# Patient Record
Sex: FEMALE | Race: WHITE | Employment: FULL TIME | URBAN - METROPOLITAN AREA
[De-identification: names, ages, dates, MRNs, and addresses within clinical notes are randomized per-mention and may not be internally consistent; named-entity substitution may affect disease eponyms.]

---

## 2017-05-30 PROCEDURE — 88175 CYTOPATH C/V AUTO FLUID REDO: CPT | Performed by: INTERNAL MEDICINE

## 2018-05-23 PROBLEM — R79.89 ELEVATED LFTS: Status: ACTIVE | Noted: 2017-05-31

## 2018-05-23 PROBLEM — R79.89 ELEVATED LFTS: Status: ACTIVE | Noted: 2018-05-23

## 2018-07-02 PROBLEM — M25.561 KNEE MENISCUS PAIN, RIGHT: Status: ACTIVE | Noted: 2018-07-02

## 2018-07-11 PROBLEM — R74.8 ELEVATED ALKALINE PHOSPHATASE LEVEL: Status: ACTIVE | Noted: 2018-07-11

## 2018-07-11 PROBLEM — R79.89 ELEVATED LFTS: Status: RESOLVED | Noted: 2017-05-31 | Resolved: 2018-07-11

## 2018-07-11 PROBLEM — R73.9 HYPERGLYCEMIA: Status: ACTIVE | Noted: 2018-07-11

## 2018-07-17 PROBLEM — R03.0 ELEVATED BLOOD PRESSURE READING IN OFFICE WITHOUT DIAGNOSIS OF HYPERTENSION: Status: ACTIVE | Noted: 2018-07-17

## 2018-07-17 PROBLEM — R03.0 ELEVATED BLOOD PRESSURE READING IN OFFICE WITHOUT DIAGNOSIS OF HYPERTENSION: Status: RESOLVED | Noted: 2018-07-17 | Resolved: 2018-07-17

## 2018-10-23 PROBLEM — M75.41 IMPINGEMENT SYNDROME OF RIGHT SHOULDER: Status: ACTIVE | Noted: 2018-10-23

## 2018-10-23 PROBLEM — M19.011 ARTHRITIS OF RIGHT SHOULDER REGION: Status: ACTIVE | Noted: 2018-10-23

## 2018-10-23 PROBLEM — M75.121 COMPLETE TEAR OF RIGHT ROTATOR CUFF: Status: ACTIVE | Noted: 2018-10-23

## 2019-09-11 PROCEDURE — 84075 ASSAY ALKALINE PHOSPHATASE: CPT | Performed by: INTERNAL MEDICINE

## 2019-09-11 PROCEDURE — 84080 ASSAY ALKALINE PHOSPHATASES: CPT | Performed by: INTERNAL MEDICINE

## 2019-09-12 PROBLEM — R74.8 ELEVATED LIVER ENZYMES: Status: ACTIVE | Noted: 2019-09-12

## 2019-09-12 PROBLEM — E78.2 MIXED HYPERLIPIDEMIA: Status: ACTIVE | Noted: 2019-09-12

## 2021-03-04 PROCEDURE — 88305 TISSUE EXAM BY PATHOLOGIST: CPT | Performed by: RADIOLOGY

## 2021-06-09 ENCOUNTER — OFFICE VISIT (OUTPATIENT)
Dept: GASTROENTEROLOGY | Facility: CLINIC | Age: 56
End: 2021-06-09
Payer: COMMERCIAL

## 2021-06-09 ENCOUNTER — TELEPHONE (OUTPATIENT)
Dept: GASTROENTEROLOGY | Facility: CLINIC | Age: 56
End: 2021-06-09

## 2021-06-09 VITALS
DIASTOLIC BLOOD PRESSURE: 82 MMHG | SYSTOLIC BLOOD PRESSURE: 127 MMHG | WEIGHT: 132.19 LBS | HEART RATE: 70 BPM | HEIGHT: 64 IN | BODY MASS INDEX: 22.57 KG/M2

## 2021-06-09 DIAGNOSIS — Z12.11 COLON CANCER SCREENING: Primary | ICD-10-CM

## 2021-06-09 PROCEDURE — 3079F DIAST BP 80-89 MM HG: CPT | Performed by: INTERNAL MEDICINE

## 2021-06-09 PROCEDURE — S0285 CNSLT BEFORE SCREEN COLONOSC: HCPCS | Performed by: INTERNAL MEDICINE

## 2021-06-09 PROCEDURE — 3074F SYST BP LT 130 MM HG: CPT | Performed by: INTERNAL MEDICINE

## 2021-06-09 PROCEDURE — 3008F BODY MASS INDEX DOCD: CPT | Performed by: INTERNAL MEDICINE

## 2021-06-09 NOTE — TELEPHONE ENCOUNTER
Scheduled for:  Colonoscopy 71234   Provider Name:  Dr. Jessica Isaac  Date:  8/16/21  Location:   [facility]  Sedation:  Mac   Time: 10:30 Am (pt is aware to arrive at 0930 Am )   Prep: Miralax+Gatorade+2 tablets Dulcolax  Prep instructions were given to pt in the [facility]

## 2021-06-09 NOTE — PROGRESS NOTES
Clarence Leone is a 49year old female.     HPI:   Patient presents with:  Colonoscopy Screening    The patient is a 16-year-old female who has a history of hypertension, thyroid disorder, hypertriglyceridemia who presents for colorectal cancer scree Types: 2 Shots of liquor, 1 Standard drinks or equivalent per week      Comment: 1x a day    Drug use: No       Medications (Active prior to today's visit):  Current Outpatient Medications   Medication Sig Dispense Refill   • Metoprolol Succinate ER 50 MG of colonoscopy of 5-10% in the best of all circumstances. All questions were answered to the patient’s satisfaction.  The patient signed informed consent and elected to proceed with colonoscopy with intervention [i.e. polypectomy, stent placement, etc.] as

## 2021-08-10 RX ORDER — MULTIVITAMIN
TABLET ORAL
COMMUNITY

## 2021-08-10 NOTE — PAT NURSING NOTE
Per IC, pt does not require a covid test prior to procedure due to asymptomatic, not immunocompromised, fully vaccinated and 2 weeks post last dose of vaccine. Discussed with pt location of [place] facility, arrival time, and start time of procedure.

## 2021-08-13 ENCOUNTER — LAB ENCOUNTER (OUTPATIENT)
Dept: LAB | Age: 56
End: 2021-08-13
Attending: INTERNAL MEDICINE
Payer: COMMERCIAL

## 2021-08-13 DIAGNOSIS — Z01.818 PRE-OP TESTING: ICD-10-CM

## 2021-08-14 LAB — SARS-COV-2 RNA RESP QL NAA+PROBE: NOT DETECTED

## 2021-08-16 ENCOUNTER — HOSPITAL ENCOUNTER (OUTPATIENT)
Age: 56
Setting detail: HOSPITAL OUTPATIENT SURGERY
Discharge: HOME | End: 2021-08-16
Attending: INTERNAL MEDICINE | Admitting: INTERNAL MEDICINE
Payer: COMMERCIAL

## 2021-08-16 ENCOUNTER — ANESTHESIA EVENT (OUTPATIENT)
Dept: ENDOSCOPY | Age: 56
End: 2021-08-16
Payer: COMMERCIAL

## 2021-08-16 ENCOUNTER — ANESTHESIA (OUTPATIENT)
Dept: ENDOSCOPY | Age: 56
End: 2021-08-16
Payer: COMMERCIAL

## 2021-08-16 VITALS
HEART RATE: 63 BPM | RESPIRATION RATE: 17 BRPM | OXYGEN SATURATION: 100 % | DIASTOLIC BLOOD PRESSURE: 80 MMHG | WEIGHT: 130 LBS | SYSTOLIC BLOOD PRESSURE: 107 MMHG | BODY MASS INDEX: 22.2 KG/M2 | HEIGHT: 64 IN

## 2021-08-16 DIAGNOSIS — Z12.11 COLON CANCER SCREENING: ICD-10-CM

## 2021-08-16 DIAGNOSIS — Z01.818 PRE-OP TESTING: Primary | ICD-10-CM

## 2021-08-16 PROCEDURE — 45385 COLONOSCOPY W/LESION REMOVAL: CPT | Performed by: INTERNAL MEDICINE

## 2021-08-16 PROCEDURE — 99070 SPECIAL SUPPLIES PHYS/QHP: CPT | Performed by: INTERNAL MEDICINE

## 2021-08-16 PROCEDURE — 88305 TISSUE EXAM BY PATHOLOGIST: CPT | Performed by: INTERNAL MEDICINE

## 2021-08-16 PROCEDURE — 45380 COLONOSCOPY AND BIOPSY: CPT | Performed by: INTERNAL MEDICINE

## 2021-08-16 RX ORDER — LIDOCAINE HYDROCHLORIDE 10 MG/ML
INJECTION, SOLUTION EPIDURAL; INFILTRATION; INTRACAUDAL; PERINEURAL AS NEEDED
Status: DISCONTINUED | OUTPATIENT
Start: 2021-08-16 | End: 2021-08-16 | Stop reason: SURG

## 2021-08-16 RX ORDER — SODIUM CHLORIDE, SODIUM LACTATE, POTASSIUM CHLORIDE, CALCIUM CHLORIDE 600; 310; 30; 20 MG/100ML; MG/100ML; MG/100ML; MG/100ML
INJECTION, SOLUTION INTRAVENOUS CONTINUOUS
Status: DISCONTINUED | OUTPATIENT
Start: 2021-08-16 | End: 2021-08-16

## 2021-08-16 RX ADMIN — LIDOCAINE HYDROCHLORIDE 50 MG: 10 INJECTION, SOLUTION EPIDURAL; INFILTRATION; INTRACAUDAL; PERINEURAL at 10:46:00

## 2021-08-16 NOTE — ANESTHESIA POSTPROCEDURE EVALUATION
Patient: Phil Yuan    Procedure Summary     Date: 08/16/21 Room / Location: [facility] ENDOSCOPY 01 / [facility] ENDO    Anesthesia Start: 1922 Anesthesia Stop: 1110    Procedure: COLONOSCOPY (N/A ) Diagnosis:       Colon cancer screening      (Polyps, d

## 2021-08-16 NOTE — ANESTHESIA PREPROCEDURE EVALUATION
Anesthesia PreOp Note    HPI:     Jyotsna Daniels is a 49year old female who presents for preoperative consultation requested by: Brenda Marrero MD    Date of Surgery: 8/16/2021    Procedure(s):  COLONOSCOPY  Indication: Colon cancer screening TAILOR'S BUNIONECTOMY;  Surgeon: Gray Hernandez DPM;  Location: [address] [place] [address]   • OTHER SURGICAL HISTORY      hand surgery   • REPAIR OF Kaur Gallegos  10/7/2013    Procedure: ARTHROPLASTY ONE (1) TOE;  Surgeon: Gray Hernandez DPM;  Location Concerns:         Service: No        Blood Transfusions: No        Caffeine Concern: No        Occupational Exposure: No        Hobby Hazards: No        Sleep Concern: No        Stress Concern: No        Weight Concern: No        Special Diet: No sleep apnea  Cardiovascular - normal exam  Exercise tolerance: good  (+) hypertension,   (-) pacemaker, valvular problems/murmurs, past MI, CAD, CABG/stent, dysrhythmias, angina, CHF    Rhythm: regular  Rate: normal    Neuro/Psych - negative ROS   (-) seiz

## 2021-08-16 NOTE — H&P
History & Physical Examination    Patient Name: Kiara Correa  MRN: [id number]  CSN: [id number]  YOB: 1965    Diagnosis:     Colon cancer screening      Multiple Vitamin (MULTI-VITAMIN DAILY) Oral Tab, Take by mouth., Disp: , Rfl:   M Use      Smoking status: Never Smoker      Smokeless tobacco: Never Used    Alcohol use:  Yes      Alcohol/week: 3.0 standard drinks      Types: 2 Shots of liquor, 1 Standard drinks or equivalent per week      Comment: 2x a day      SYSTEM Check if Review i

## 2021-08-16 NOTE — OPERATIVE REPORT
[facility] HOSP - [facility] Endoscopy Report      Preoperative Diagnosis:  - colon cancer screening      Postoperative Diagnosis:  - colon polyps   - diverticulosis  - internal hemorrhoids      Procedure:    Colonoscopy       Surgeon:  Kelly Cooley M.D.

## 2021-08-18 ENCOUNTER — TELEPHONE (OUTPATIENT)
Dept: GASTROENTEROLOGY | Facility: CLINIC | Age: 56
End: 2021-08-18

## 2021-08-18 NOTE — TELEPHONE ENCOUNTER
----- Message from Kelley Horton MD sent at 8/17/2021  3:34 PM CDT -----  I wanted to get back to you with your colonoscopy results. You had 2 colon polyps removed which were benign.   I would advise a repeat colonoscopy in 7 years to make sure no new p

## 2021-08-18 NOTE — TELEPHONE ENCOUNTER
Health Maintenance Updated. 7 year colonoscopy recall entered into patient outreach in [id number]. Next colonoscopy will be due 8/16/2028. Result letter generated and mailed to patient's home address.

## (undated) DEVICE — Device: Brand: DEFENDO AIR/WATER/SUCTION AND BIOPSY VALVE

## (undated) DEVICE — FORCEP RADIAL JAW 4

## (undated) DEVICE — SNARE OPTMZ PLPCTM TRP

## (undated) DEVICE — SNARE ENDOSCOPIC 10MM ROUND

## (undated) DEVICE — LINE MNTR ADLT SET O2 INTMD

## (undated) DEVICE — MEDI-VAC NON-CONDUCTIVE SUCTION TUBING 6MM X 1.8M (6FT.) L: Brand: CARDINAL HEALTH

## (undated) DEVICE — Device: Brand: CUSTOM PROCEDURE KIT

## (undated) NOTE — LETTER
8/18/2021              [address]          Dear Tuyet Elkins,    I wanted to get back to you with your colonoscopy results.  You had 2 colon polyps removed which were benign.  I would advise a repeat